# Patient Record
Sex: FEMALE | Race: WHITE | URBAN - METROPOLITAN AREA
[De-identification: names, ages, dates, MRNs, and addresses within clinical notes are randomized per-mention and may not be internally consistent; named-entity substitution may affect disease eponyms.]

---

## 2020-08-07 ENCOUNTER — OFFICE VISIT (OUTPATIENT)
Dept: URBAN - METROPOLITAN AREA CLINIC 54 | Facility: CLINIC | Age: 50
End: 2020-08-07
Payer: MEDICARE

## 2020-08-07 DIAGNOSIS — K76.0 FATTY LIVER: ICD-10-CM

## 2020-08-07 DIAGNOSIS — E88.81 METABOLIC SYNDROME: ICD-10-CM

## 2020-08-07 PROCEDURE — 99203 OFFICE O/P NEW LOW 30 MIN: CPT | Performed by: INTERNAL MEDICINE

## 2020-08-07 PROCEDURE — 82607 VITAMIN B-12: CPT | Performed by: INTERNAL MEDICINE

## 2020-08-07 PROCEDURE — 82746 ASSAY OF FOLIC ACID SERUM: CPT | Performed by: INTERNAL MEDICINE

## 2020-08-07 PROCEDURE — 83516 IMMUNOASSAY NONANTIBODY: CPT | Performed by: INTERNAL MEDICINE

## 2020-08-07 PROCEDURE — 83036 HEMOGLOBIN GLYCOSYLATED A1C: CPT | Performed by: INTERNAL MEDICINE

## 2020-08-07 RX ORDER — ATORVASTATIN CALCIUM 40 MG/1
1 TABLET TABLET, FILM COATED ORAL ONCE A DAY
Status: ACTIVE | COMMUNITY

## 2020-08-07 RX ORDER — FLUOXETINE 40 MG/1
1 CAPSULE CAPSULE ORAL ONCE A DAY
Status: ACTIVE | COMMUNITY

## 2020-08-07 RX ORDER — METFORMIN HYDROCHLORIDE 500 MG/1
1 TABLET WITH A MEAL TABLET, FILM COATED ORAL TID
Status: ACTIVE | COMMUNITY

## 2020-08-07 RX ORDER — CARVEDILOL 12.5 MG/1
1 TABLET WITH FOOD TABLET, FILM COATED ORAL TWICE A DAY
Status: ACTIVE | COMMUNITY

## 2020-08-07 RX ORDER — ASPIRIN 81 MG/1
1 TABLET TABLET, COATED ORAL ONCE A DAY
Status: ACTIVE | COMMUNITY

## 2020-08-07 RX ORDER — LISINOPRIL 40 MG/1
1 TABLET TABLET ORAL ONCE A DAY
Status: ACTIVE | COMMUNITY

## 2020-08-07 NOTE — HPI-TODAY'S VISIT:
Patient is a 51 yo woman referred for abnormal LFT's. Recent blood draw showed; ALT 57, AST 37, AP 97 and normal TB. No previous history of liver disease. She carries a diagnosis of fatty liver x 10 days. Risk factors include obesity, DM, HLD (TGs 228) and HTN. She does not know her last HBA1C. No signs symptoms of liver disease. She has never had a liver biopsy or advanced liver imaging. Other comorbidities include ANAY and hypertensive heart disease. She is on Atorvastatin x 4 years. LFTs near normal in 2017. No tobacco or alcohol use. Recent USG shows hepatomegaly and fatty liver.

## 2020-08-13 LAB
25-HYDROXY, VITAMIN D-2: <1
25-HYDROXY, VITAMIN D-3: 25
25-HYDROXY, VITAMIN D: 25
A/G RATIO: 2
ALBUMIN: 4.5
ALKALINE PHOSPHATASE: 103
ALPHA 2-MACROGLOBULINS, QN: 170
ALPHA-1-ANTITRYPSIN, SERUM: 137
ALT (SGPT) P5P: 51
ALT (SGPT): 45
ANTINUCLEAR ANTIBODIES, IFA: NEGATIVE
APOLIPOPROTEIN A-1: 123
AST (SGOT) P5P: 37
AST (SGOT): 29
BASO (ABSOLUTE): 0.1
BASOS: 1
BILIRUBIN, TOTAL: 0.3
BILIRUBIN, TOTAL: 0.4
BUN/CREATININE RATIO: 16
BUN: 13
CALCIUM: 10.4
CARBON DIOXIDE, TOTAL: 25
CERULOPLASMIN: 29.4
CHLORIDE: 102
CHOLESTEROL, TOTAL: 134
COMMENT:: (no result)
CREATININE: 0.81
EGFR IF AFRICN AM: 99
EGFR IF NONAFRICN AM: 86
EOS (ABSOLUTE): 0.3
EOS: 5
FERRITIN, SERUM: 400
FIBROSIS SCORE: 0.09
FIBROSIS SCORING:: (no result)
FIBROSIS STAGE: (no result)
FOLATE (FOLIC ACID), SERUM: >20
GGT: 28
GLOBULIN, TOTAL: 2.3
GLUCOSE, SERUM: 183
GLUCOSE: 186
HAPTOGLOBIN: 210
HBSAG SCREEN: NEGATIVE
HCV AB: <0.1
HEIGHT:: 63
HEMATOCRIT: 39.8
HEMATOLOGY COMMENTS:: (no result)
HEMOGLOBIN A1C: 7.7
HEMOGLOBIN: 13.4
HEP A AB, TOTAL: NEGATIVE
HEP B CORE AB, TOT: NEGATIVE
HEP B SURFACE AB, QUAL: NON REACTIVE
IMMATURE CELLS: (no result)
IMMATURE GRANS (ABS): 0
IMMATURE GRANULOCYTES: 0
IMMUNOGLOBULIN A, QN, SERUM: 190
IMMUNOGLOBULIN E, TOTAL: 109
IMMUNOGLOBULIN G, QN, SERUM: 737
IMMUNOGLOBULIN M, QN, SERUM: 58
INR: 1
INTERPRETATION:: (no result)
INTERPRETATIONS:: (no result)
IRON BIND.CAP.(TIBC): 262
IRON SATURATION: 26
IRON: 67
LIMITATIONS:: (no result)
LYMPHS (ABSOLUTE): 2
LYMPHS: 36
Lab: (no result)
MCH: 31.4
MCHC: 33.7
MCV: 93
MITOCHONDRIAL (M2) ANTIBODY: <20
MONOCYTES(ABSOLUTE): 0.4
MONOCYTES: 7
NASH GRADE: (no result)
NASH SCORE: 0.75
NASH SCORING: (no result)
NEUTROPHILS (ABSOLUTE): 2.8
NEUTROPHILS: 51
NRBC: (no result)
PLATELETS: 284
POTASSIUM: 4.8
PROTEIN, TOTAL: 6.8
PROTHROMBIN TIME: 10.6
RBC: 4.27
RDW: 11.8
SODIUM: 140
STEATOSIS GRADE: (no result)
STEATOSIS GRADING: (no result)
STEATOSIS SCORE: 0.79
T-TRANSGLUTAMINASE (TTG) IGA: <2
TRIGLYCERIDES: 152
TSH: 1.76
UIBC: 195
VITAMIN B12: 689
WBC: 5.5
WEIGHT:: 269

## 2020-08-18 ENCOUNTER — TELEPHONE ENCOUNTER (OUTPATIENT)
Dept: URBAN - METROPOLITAN AREA CLINIC 92 | Facility: CLINIC | Age: 50
End: 2020-08-18

## 2020-08-18 RX ORDER — METFORMIN HYDROCHLORIDE 500 MG/1
1 TABLET WITH A MEAL TABLET, FILM COATED ORAL TID
Status: ACTIVE | COMMUNITY

## 2020-08-18 RX ORDER — ASPIRIN 81 MG/1
1 TABLET TABLET, COATED ORAL ONCE A DAY
Status: ACTIVE | COMMUNITY

## 2020-08-18 RX ORDER — ATORVASTATIN CALCIUM 40 MG/1
1 TABLET TABLET, FILM COATED ORAL ONCE A DAY
Status: ACTIVE | COMMUNITY

## 2020-08-18 RX ORDER — LISINOPRIL 40 MG/1
1 TABLET TABLET ORAL ONCE A DAY
Status: ACTIVE | COMMUNITY

## 2020-08-18 RX ORDER — FLUOXETINE 40 MG/1
1 CAPSULE CAPSULE ORAL ONCE A DAY
Status: ACTIVE | COMMUNITY

## 2020-08-18 RX ORDER — CARVEDILOL 12.5 MG/1
1 TABLET WITH FOOD TABLET, FILM COATED ORAL TWICE A DAY
Status: ACTIVE | COMMUNITY

## 2020-09-11 ENCOUNTER — OFFICE VISIT (OUTPATIENT)
Dept: URBAN - METROPOLITAN AREA CLINIC 54 | Facility: CLINIC | Age: 50
End: 2020-09-11
Payer: MEDICARE

## 2020-09-11 DIAGNOSIS — K75.81 NASH (NONALCOHOLIC STEATOHEPATITIS): ICD-10-CM

## 2020-09-11 DIAGNOSIS — R94.5 ABNORMAL LFTS: ICD-10-CM

## 2020-09-11 DIAGNOSIS — E66.9 OBESITY: ICD-10-CM

## 2020-09-11 DIAGNOSIS — K76.0 FATTY LIVER: ICD-10-CM

## 2020-09-11 DIAGNOSIS — E11.9 DM (DIABETES MELLITUS): ICD-10-CM

## 2020-09-11 DIAGNOSIS — E88.81 METABOLIC SYNDROME: ICD-10-CM

## 2020-09-11 PROBLEM — 237602007 METABOLIC SYNDROME: Status: ACTIVE | Noted: 2020-08-07

## 2020-09-11 PROCEDURE — 99214 OFFICE O/P EST MOD 30 MIN: CPT | Performed by: INTERNAL MEDICINE

## 2020-09-11 RX ORDER — METFORMIN HYDROCHLORIDE 500 MG/1
1 TABLET WITH A MEAL TABLET, FILM COATED ORAL TID
Status: ACTIVE | COMMUNITY

## 2020-09-11 RX ORDER — LISINOPRIL 40 MG/1
1 TABLET TABLET ORAL ONCE A DAY
Status: ACTIVE | COMMUNITY

## 2020-09-11 RX ORDER — ASPIRIN 81 MG/1
1 TABLET TABLET, COATED ORAL ONCE A DAY
Status: ACTIVE | COMMUNITY

## 2020-09-11 RX ORDER — FLUOXETINE 40 MG/1
1 CAPSULE CAPSULE ORAL ONCE A DAY
Status: ACTIVE | COMMUNITY

## 2020-09-11 RX ORDER — CARVEDILOL 12.5 MG/1
1 TABLET WITH FOOD TABLET, FILM COATED ORAL TWICE A DAY
Status: ACTIVE | COMMUNITY

## 2020-09-11 RX ORDER — ATORVASTATIN CALCIUM 40 MG/1
1 TABLET TABLET, FILM COATED ORAL ONCE A DAY
Status: ACTIVE | COMMUNITY

## 2020-09-11 NOTE — PHYSICAL EXAM GASTROINTESTINAL
Abdomen , soft, obese, nontender, nondistended , no guarding or rigidity , no masses palpable , normal bowel sounds , Liver and Spleen , no hepatomegaly present , no hepatosplenomegaly , liver nontender , spleen not palpable

## 2020-09-11 NOTE — HPI-TODAY'S VISIT:
Patient is a 51 yo woman referred for abnormal LFT's. Recent blood draw showed; ALT 57, AST 37, AP 97 and normal TB. No previous history of liver disease. She carries a diagnosis of fatty liver x 10 days. Risk factors include obesity, DM, HLD (TGs 228) and HTN. She does not know her last HBA1C. No signs symptoms of liver  disease. She has never had a liver biopsy or advanced liver imaging. Other comorbidities include ANAY and hypertensive heart disease. She is on Atorvastatin x 4 years. LFTs near normal in 2017. No tobacco or alcohol use. Recent USG shows hepatomegaly and fatty liver.  Follow Up 9/11/20: Patient presents for routine follow up. Secondary liver workup was negative. Ferritin noted at 400. Liver biopsy showed moderate steatosis and F1 fibrosis. She has started Vit E 800 IU daily.

## 2020-09-11 NOTE — PHYSICAL EXAM NECK/THYROID:
normal appearance , without tenderness upon palpation , no deformities , trachea midline , Thyroid normal size , no thyroid nodules , no masses , no JVD , thyroid nontender 8

## 2021-03-08 ENCOUNTER — DASHBOARD ENCOUNTERS (OUTPATIENT)
Age: 51
End: 2021-03-08

## 2021-03-12 ENCOUNTER — OFFICE VISIT (OUTPATIENT)
Dept: URBAN - METROPOLITAN AREA CLINIC 54 | Facility: CLINIC | Age: 51
End: 2021-03-12

## 2021-03-12 RX ORDER — ATORVASTATIN CALCIUM 40 MG/1
1 TABLET TABLET, FILM COATED ORAL ONCE A DAY
Status: ACTIVE | COMMUNITY

## 2021-03-12 RX ORDER — ASPIRIN 81 MG/1
1 TABLET TABLET, COATED ORAL ONCE A DAY
Status: ACTIVE | COMMUNITY

## 2021-03-12 RX ORDER — LISINOPRIL 40 MG/1
1 TABLET TABLET ORAL ONCE A DAY
Status: ACTIVE | COMMUNITY

## 2021-03-12 RX ORDER — FLUOXETINE 40 MG/1
1 CAPSULE CAPSULE ORAL ONCE A DAY
Status: ACTIVE | COMMUNITY

## 2021-03-12 RX ORDER — CARVEDILOL 12.5 MG/1
1 TABLET WITH FOOD TABLET, FILM COATED ORAL TWICE A DAY
Status: ACTIVE | COMMUNITY

## 2021-03-12 RX ORDER — METFORMIN HYDROCHLORIDE 500 MG/1
1 TABLET WITH A MEAL TABLET, FILM COATED ORAL TID
Status: ACTIVE | COMMUNITY

## 2022-04-30 ENCOUNTER — TELEPHONE ENCOUNTER (OUTPATIENT)
Dept: URBAN - METROPOLITAN AREA CLINIC 121 | Facility: CLINIC | Age: 52
End: 2022-04-30

## 2022-04-30 RX ORDER — FAMOTIDINE 40 MG/1
TABLET, FILM COATED ORAL
OUTPATIENT
Start: 2008-08-28

## 2022-04-30 RX ORDER — METFORMIN HCL 500 MG/1
TABLET ORAL
OUTPATIENT
Start: 2014-04-23

## 2022-04-30 RX ORDER — VERAPAMIL HYDROCHLORIDE 360 MG/1
CAPSULE, DELAYED RELEASE PELLETS ORAL
OUTPATIENT
Start: 2008-08-28

## 2022-04-30 RX ORDER — PANTOPRAZOLE SODIUM 40 MG/1
TAKE 1PO BID TABLET, DELAYED RELEASE ORAL
OUTPATIENT
Start: 2009-03-30

## 2022-04-30 RX ORDER — PANTOPRAZOLE SODIUM 40 MG/1
TAKE 1PO BID TABLET, DELAYED RELEASE ORAL
OUTPATIENT
Start: 2009-03-30 | End: 2014-04-23

## 2022-04-30 RX ORDER — HYDROCODONE BITARTRATE AND HOMATROPINE METHYLBROMIDE 5; 1.5 MG/1; MG/1
TABLET ORAL
OUTPATIENT
Start: 2008-08-28 | End: 2014-04-23

## 2022-04-30 RX ORDER — DICYCLOMINE HYDROCHLORIDE 20 MG/1
TABLET ORAL
OUTPATIENT
Start: 2008-08-28

## 2022-04-30 RX ORDER — LISINOPRIL 2.5 MG/1
TABLET ORAL
OUTPATIENT
Start: 2014-04-23

## 2022-04-30 RX ORDER — VERAPAMIL HYDROCHLORIDE 360 MG/1
CAPSULE, DELAYED RELEASE PELLETS ORAL
OUTPATIENT
Start: 2008-08-28 | End: 2014-04-23

## 2022-04-30 RX ORDER — ASPIRIN 81 MG/1
TABLET ORAL
OUTPATIENT
Start: 2014-04-23

## 2022-04-30 RX ORDER — DICYCLOMINE HYDROCHLORIDE 20 MG/1
TABLET ORAL
OUTPATIENT
Start: 2008-08-28 | End: 2014-04-23

## 2022-04-30 RX ORDER — HYDROCODONE BITARTRATE AND HOMATROPINE METHYLBROMIDE 5; 1.5 MG/1; MG/1
TABLET ORAL
OUTPATIENT
Start: 2008-08-28

## 2022-04-30 RX ORDER — CARVEDILOL 3.12 MG/1
TABLET, FILM COATED ORAL
OUTPATIENT
Start: 2014-04-23

## 2022-04-30 RX ORDER — FAMOTIDINE 40 MG/1
TABLET, FILM COATED ORAL
OUTPATIENT
Start: 2008-08-28 | End: 2014-04-23

## 2022-05-01 ENCOUNTER — TELEPHONE ENCOUNTER (OUTPATIENT)
Dept: URBAN - METROPOLITAN AREA CLINIC 121 | Facility: CLINIC | Age: 52
End: 2022-05-01

## 2022-05-01 RX ORDER — HYDROCORTISONE/LIDOCAINE/ALOE 2 %-2 %
APPLY WITH GLOVED FINGER LIBERALLY INSIDE AND OUTSIDE OF RECTUM TWICE A DAY KIT RECTAL
Status: ACTIVE | COMMUNITY
Start: 2015-03-13

## 2022-05-01 RX ORDER — ASPIRIN 81 MG/1
QD TABLET ORAL
Status: ACTIVE | COMMUNITY
Start: 2014-05-02

## 2022-05-01 RX ORDER — HYDROCORTISONE 25 MG/G
APPLY AS DIRECTED TWICE DAILY CREAM TOPICAL
Status: ACTIVE | COMMUNITY
Start: 2014-04-23

## 2022-05-01 RX ORDER — FLUOXETINE HYDROCHLORIDE 20 MG/1
QD CAPSULE ORAL
Status: ACTIVE | COMMUNITY
Start: 2014-05-02

## 2022-05-01 RX ORDER — METFORMIN HCL 500 MG/1
QD TABLET ORAL
Status: ACTIVE | COMMUNITY
Start: 2014-05-02

## 2022-05-01 RX ORDER — CARVEDILOL 3.12 MG/1
BID TABLET, FILM COATED ORAL
Status: ACTIVE | COMMUNITY
Start: 2014-05-02

## 2022-05-01 RX ORDER — LISINOPRIL 2.5 MG/1
QD TABLET ORAL
Status: ACTIVE | COMMUNITY
Start: 2014-05-02

## 2022-05-01 RX ORDER — HYDROCORTISONE 25 MG/G
APPLY TO RECTUM AS DIRECTED TWICE A DAY CREAM TOPICAL
Status: ACTIVE | COMMUNITY
Start: 2014-05-02